# Patient Record
Sex: FEMALE | Race: WHITE
[De-identification: names, ages, dates, MRNs, and addresses within clinical notes are randomized per-mention and may not be internally consistent; named-entity substitution may affect disease eponyms.]

---

## 2017-11-16 ENCOUNTER — HOSPITAL ENCOUNTER (EMERGENCY)
Dept: HOSPITAL 47 - EC | Age: 62
Discharge: HOME | End: 2017-11-16
Payer: SELF-PAY

## 2017-11-16 VITALS — DIASTOLIC BLOOD PRESSURE: 78 MMHG | SYSTOLIC BLOOD PRESSURE: 131 MMHG | HEART RATE: 63 BPM | TEMPERATURE: 97.9 F

## 2017-11-16 VITALS — RESPIRATION RATE: 16 BRPM

## 2017-11-16 DIAGNOSIS — K52.9: Primary | ICD-10-CM

## 2017-11-16 DIAGNOSIS — Z91.048: ICD-10-CM

## 2017-11-16 DIAGNOSIS — Z88.6: ICD-10-CM

## 2017-11-16 DIAGNOSIS — Z87.891: ICD-10-CM

## 2017-11-16 DIAGNOSIS — Z91.018: ICD-10-CM

## 2017-11-16 LAB
ALP SERPL-CCNC: 72 U/L (ref 38–126)
ALT SERPL-CCNC: 50 U/L (ref 9–52)
AMYLASE SERPL-CCNC: <30 U/L (ref 30–110)
ANION GAP SERPL CALC-SCNC: 10 MMOL/L
AST SERPL-CCNC: 24 U/L (ref 14–36)
BASOPHILS # BLD AUTO: 0 K/UL (ref 0–0.2)
BASOPHILS NFR BLD AUTO: 0 %
BUN SERPL-SCNC: 20 MG/DL (ref 7–17)
CALCIUM SPEC-MCNC: 9.8 MG/DL (ref 8.4–10.2)
CH: 28.8
CHCM: 33.4
CHLORIDE SERPL-SCNC: 101 MMOL/L (ref 98–107)
CK SERPL-CCNC: 43 U/L (ref 30–135)
CO2 SERPL-SCNC: 28 MMOL/L (ref 22–30)
EOSINOPHIL # BLD AUTO: 0 K/UL (ref 0–0.7)
EOSINOPHIL NFR BLD AUTO: 0 %
ERYTHROCYTE [DISTWIDTH] IN BLOOD BY AUTOMATED COUNT: 4.94 M/UL (ref 3.8–5.4)
ERYTHROCYTE [DISTWIDTH] IN BLOOD: 12.2 % (ref 11.5–15.5)
GLUCOSE SERPL-MCNC: 135 MG/DL (ref 74–99)
HCT VFR BLD AUTO: 42.7 % (ref 34–46)
HDW: 2.46
HGB BLD-MCNC: 14.1 GM/DL (ref 11.4–16)
LUC NFR BLD AUTO: 1 %
LYMPHOCYTES # SPEC AUTO: 1.7 K/UL (ref 1–4.8)
LYMPHOCYTES NFR SPEC AUTO: 18 %
MCH RBC QN AUTO: 28.6 PG (ref 25–35)
MCHC RBC AUTO-ENTMCNC: 33.1 G/DL (ref 31–37)
MCV RBC AUTO: 86.6 FL (ref 80–100)
MONOCYTES # BLD AUTO: 0.4 K/UL (ref 0–1)
MONOCYTES NFR BLD AUTO: 5 %
NEUTROPHILS # BLD AUTO: 7.2 K/UL (ref 1.3–7.7)
NEUTROPHILS NFR BLD AUTO: 76 %
NON-AFRICAN AMERICAN GFR(MDRD): >60
PARTICLE COUNT: (no result)
PH UR: 5.5 [PH] (ref 5–8)
POTASSIUM SERPL-SCNC: 4.6 MMOL/L (ref 3.5–5.1)
PROT SERPL-MCNC: 6.8 G/DL (ref 6.3–8.2)
PROT UR QL: (no result)
SODIUM SERPL-SCNC: 139 MMOL/L (ref 137–145)
SP GR UR: 1.02 (ref 1–1.03)
SQUAMOUS UR QL AUTO: 9 /HPF (ref 0–4)
TROPONIN I SERPL-MCNC: <0.012 NG/ML (ref 0–0.03)
UA BILLING (MACRO VS. MICRO): (no result)
UROBILINOGEN UR QL STRIP: <2 MG/DL (ref ?–2)
WBC # BLD AUTO: 0.14 10*3/UL
WBC # BLD AUTO: 9.5 K/UL (ref 3.8–10.6)
WBC #/AREA URNS HPF: 28 /HPF (ref 0–5)
WBC (PEROX): 8.96

## 2017-11-16 PROCEDURE — 82150 ASSAY OF AMYLASE: CPT

## 2017-11-16 PROCEDURE — 82553 CREATINE MB FRACTION: CPT

## 2017-11-16 PROCEDURE — 96361 HYDRATE IV INFUSION ADD-ON: CPT

## 2017-11-16 PROCEDURE — 80053 COMPREHEN METABOLIC PANEL: CPT

## 2017-11-16 PROCEDURE — 96360 HYDRATION IV INFUSION INIT: CPT

## 2017-11-16 PROCEDURE — 84484 ASSAY OF TROPONIN QUANT: CPT

## 2017-11-16 PROCEDURE — 99284 EMERGENCY DEPT VISIT MOD MDM: CPT

## 2017-11-16 PROCEDURE — 74000: CPT

## 2017-11-16 PROCEDURE — 82550 ASSAY OF CK (CPK): CPT

## 2017-11-16 PROCEDURE — 85025 COMPLETE CBC W/AUTO DIFF WBC: CPT

## 2017-11-16 PROCEDURE — 76705 ECHO EXAM OF ABDOMEN: CPT

## 2017-11-16 PROCEDURE — 36415 COLL VENOUS BLD VENIPUNCTURE: CPT

## 2017-11-16 PROCEDURE — 83690 ASSAY OF LIPASE: CPT

## 2017-11-16 PROCEDURE — 81001 URINALYSIS AUTO W/SCOPE: CPT

## 2017-11-16 NOTE — US
EXAMINATION TYPE: US gallbladder

 

DATE OF EXAM: 11/16/2017

 

COMPARISON: NONE

 

CLINICAL HISTORY: Pain. Vomiting and diarrhea last night. Nausea and loss of appetite for 1 week 

 

EXAM MEASUREMENTS:

 

Liver Length:  19.3 cm   

Gallbladder Wall:  0.2 cm   

CBD:  0.4 cm

Right Kidney:  11.1 x 4.9 x 4.5 cm

 

 

 

Pancreas:  Obscured by bowel gas

Liver:  hyperechoic area noted adjacent to a hypoechoic focus next to the regi hepatitis = 2.0 x 1.5
 x 1.9cm   

Gallbladder:  no evidence of stones

**Evidence for sonographic Zee's sign:  no

CBD:  wnl 

Right Kidney:  no evidence of hydronephrosis or mass

 

There is no ascites. Right kidney shows normal cortical medullary differentiation.

 

IMPRESSION: Findings may represent fatty infiltration of liver with some focal fatty sparing near the
 regi hepatis, MRI of the liver could be performed for better characterization. There is hepatomegal
y. Exam is somewhat limited.

## 2017-11-16 NOTE — XR
EXAMINATION TYPE: XR KUB

 

DATE OF EXAM: 11/16/2017 3:25 PM

 

CLINICAL HISTORY:  Nausea and vomiting with abdominal pain.

 

TECHNIQUE: Single upright abdominal radiograph was obtained.

 

COMPARISON: Right upper quadrant ultrasound dated 11/16/2017.

 

FINDINGS: Scattered gas is seen in non-distended small bowel loops. Gas and fecal material is seen in
 non-distended colon. There is no visceromegaly, pneumoperitoneum, or abnormal calcification apprecia
torito. The lung bases are clear and the osseous structures are intact.

 

IMPRESSION: 

Nonobstructive bowel gas pattern.
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
## 2020-05-26 ENCOUNTER — HOSPITAL ENCOUNTER (EMERGENCY)
Dept: HOSPITAL 47 - EC | Age: 65
Discharge: HOME | End: 2020-05-26
Payer: COMMERCIAL

## 2020-05-26 VITALS
DIASTOLIC BLOOD PRESSURE: 90 MMHG | TEMPERATURE: 98.1 F | HEART RATE: 80 BPM | RESPIRATION RATE: 18 BRPM | SYSTOLIC BLOOD PRESSURE: 140 MMHG

## 2020-05-26 DIAGNOSIS — R20.2: Primary | ICD-10-CM
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
## 2020-05-26 DIAGNOSIS — M79.622: ICD-10-CM

## 2020-05-26 DIAGNOSIS — Z88.6: ICD-10-CM

## 2020-05-26 DIAGNOSIS — Z91.018: ICD-10-CM

## 2020-05-26 DIAGNOSIS — R91.8: ICD-10-CM

## 2020-05-26 DIAGNOSIS — Z87.891: ICD-10-CM

## 2020-05-26 DIAGNOSIS — M54.2: ICD-10-CM
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
## 2020-05-26 DIAGNOSIS — Z87.39: ICD-10-CM

## 2020-05-26 PROCEDURE — 71046 X-RAY EXAM CHEST 2 VIEWS: CPT

## 2020-05-26 PROCEDURE — 72125 CT NECK SPINE W/O DYE: CPT

## 2020-05-26 PROCEDURE — 99284 EMERGENCY DEPT VISIT MOD MDM: CPT

## 2020-05-26 PROCEDURE — 70450 CT HEAD/BRAIN W/O DYE: CPT
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
## 2020-08-03 ENCOUNTER — HOSPITAL ENCOUNTER (OUTPATIENT)
Dept: HOSPITAL 47 - RADMRIMAIN | Age: 65
Discharge: HOME | End: 2020-08-03
Attending: FAMILY MEDICINE
Payer: COMMERCIAL

## 2020-08-03 DIAGNOSIS — Z53.9: Primary | ICD-10-CM

## 2020-11-04 ENCOUNTER — HOSPITAL ENCOUNTER (OUTPATIENT)
Dept: HOSPITAL 47 - PNWHC3 | Age: 65
Discharge: HOME | End: 2020-11-04
Attending: ANESTHESIOLOGY
Payer: COMMERCIAL

## 2020-11-04 VITALS
SYSTOLIC BLOOD PRESSURE: 193 MMHG | TEMPERATURE: 97.8 F | RESPIRATION RATE: 14 BRPM | HEART RATE: 73 BPM | DIASTOLIC BLOOD PRESSURE: 96 MMHG

## 2020-11-04 DIAGNOSIS — M54.5: Primary | ICD-10-CM

## 2020-11-04 DIAGNOSIS — Z79.899: ICD-10-CM

## 2020-11-04 DIAGNOSIS — Z88.6: ICD-10-CM

## 2020-11-04 DIAGNOSIS — Z87.892: ICD-10-CM

## 2020-11-04 DIAGNOSIS — Z79.891: ICD-10-CM

## 2020-11-04 PROCEDURE — 99211 OFF/OP EST MAY X REQ PHY/QHP: CPT
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
## 2020-11-24 ENCOUNTER — HOSPITAL ENCOUNTER (OUTPATIENT)
Dept: HOSPITAL 47 - ORPAIN | Age: 65
Discharge: HOME | End: 2020-11-24
Attending: ANESTHESIOLOGY
Payer: COMMERCIAL

## 2020-11-24 VITALS — SYSTOLIC BLOOD PRESSURE: 131 MMHG | HEART RATE: 101 BPM | DIASTOLIC BLOOD PRESSURE: 79 MMHG

## 2020-11-24 VITALS — BODY MASS INDEX: 36.7 KG/M2

## 2020-11-24 VITALS — TEMPERATURE: 97.3 F

## 2020-11-24 VITALS — RESPIRATION RATE: 16 BRPM

## 2020-11-24 DIAGNOSIS — M96.1: ICD-10-CM

## 2020-11-24 DIAGNOSIS — M46.1: ICD-10-CM

## 2020-11-24 DIAGNOSIS — Z78.0: ICD-10-CM

## 2020-11-24 DIAGNOSIS — Z88.6: ICD-10-CM

## 2020-11-24 DIAGNOSIS — M54.16: Primary | ICD-10-CM

## 2020-11-24 DIAGNOSIS — Z91.018: ICD-10-CM

## 2020-11-24 PROCEDURE — 64483 NJX AA&/STRD TFRM EPI L/S 1: CPT

## 2020-11-24 PROCEDURE — 99152 MOD SED SAME PHYS/QHP 5/>YRS: CPT

## 2020-11-24 NOTE — P.PCN
Date of Procedure: 11/24/20


Procedure(s) Performed: 








PREOPERATIVE DIAGNOSIS:1- Lumbar radiculopathy .   2-left sacroiliitis.  3-

failed back surgery syndrome lumbar area





POSTOPERATIVE DIAGNOSIS: Same as preoperative diagnoses.





PROCEDURE


1. Transforaminal epidural steroid injection under fluoroscopic guidance at left

 L4-5 level.  (Fluoroscopy images stored on file in the radiology Department )


2. Lumbar epidurogram .





ANESTHESIA: Local with 1% lidocaine 3 ml , moderate sedation with intravenous 

Versed 2 mg  and fentanyle 50 micrograms.





EBL: Minimal


PROCEDURE INDICATION: The patient with low back pain and radiculopathy symptoms 

unresponsive to conservative treatment.


PROCEDURE DESCRIPTION / TECHNIQUE: 


  The patient was seen and identified in the preoperative area. Risks, benefits,

complications, and alternatives were discussed with the patient. The patient 

agreed to proceed with the procedure and signed the consent. IV was started, and

vital signs were stable.


  Patient was taken to the OR and time out was completed. The patient was placed

 in the prone position on procedure table and a pillow was placed under the 

abdomen to reduce lumbar lordosis. The  lumbosacral area was prepped  and draped

in the usual sterile fashion. Critical pause was taken. Vital signs were closely

monitored during the procedure. Conscious sedation was used during the procedure

to decrease patient s anxiety. 


Using oblique fluoroscopy, the chin of the ``Elvin dog at  left L4-5  level 

was identified, and the skin and deeper tissues just below was localized with 1%

lidocaine. Subsequently, a 22-gauge 5-inch  spinal needle was advanced under a 

tunneled view fluoroscopic guidance just underneath the chin of the ``Elvin dog

 at the left L4-5  Under lateral fluoroscopy, the needle was then advanced to 

the posterior border of the  interforaminal  space. After negative aspiration of

CSF and blood and with no paresthesias, 1 mL Isovue  200 contrast dye was 

injected excellent epidurogram and outlining of the  nerve root Subsequently, 3 

mL of block solution containing 80 mg Depo-Medrol  and 2 mL of 0.9% normal 

saline PF  was injected. Needle was removed intact . At the end of the 

procedure, skin was cleansed, and bandages were applied.


COMPLICATIONS:none 


DISPOSITION / PLANS: The patient was placed in a supine position and transferred

to the recovery area in a stable condition for observation. There was no 

evidence of lower extremity motor or sensory deficit after the procedure.  

Patient was discharged from the recovery room after meeting discharge criteria. 

Home discharge instructions were given to the patient by the staff. The patient 

was reexamined prior to discharge.

## 2020-11-24 NOTE — FL
EXAMINATION TYPE: FL guided pain mgmt statistic

 

DATE OF EXAM: 11/24/2020

 

CLINICAL HISTORY: Low back pain.

 

TECHNIQUE: Fluoroscopy.

 

COMPARISON:  None.

 

FINDINGS:  Fluoroscopic guidance was provided during pain relief procedure performed by Dr. Kramer 
.  A total of 23 seconds of fluoroscopic time was utilized during the procedure and 1 spot images are
 acquired. Single image acquired shows needle localization  in the lower lumbar spine.

 

IMPRESSION:  As Above.

## 2020-12-29 ENCOUNTER — HOSPITAL ENCOUNTER (OUTPATIENT)
Dept: HOSPITAL 47 - ORPAIN | Age: 65
Discharge: HOME | End: 2020-12-29
Attending: ANESTHESIOLOGY
Payer: COMMERCIAL

## 2020-12-29 VITALS — SYSTOLIC BLOOD PRESSURE: 148 MMHG | DIASTOLIC BLOOD PRESSURE: 90 MMHG

## 2020-12-29 VITALS — HEART RATE: 108 BPM

## 2020-12-29 VITALS — TEMPERATURE: 96.8 F | RESPIRATION RATE: 18 BRPM

## 2020-12-29 VITALS — BODY MASS INDEX: 36.3 KG/M2

## 2020-12-29 DIAGNOSIS — Z78.0: ICD-10-CM

## 2020-12-29 DIAGNOSIS — M46.1: ICD-10-CM

## 2020-12-29 DIAGNOSIS — M96.1: Primary | ICD-10-CM

## 2020-12-29 PROCEDURE — 64483 NJX AA&/STRD TFRM EPI L/S 1: CPT

## 2020-12-29 PROCEDURE — 99152 MOD SED SAME PHYS/QHP 5/>YRS: CPT

## 2020-12-29 NOTE — P.PCN
Date of Procedure: 12/29/20


Procedure(s) Performed: 





PREOPERATIVE DIAGNOSIS:1- Lumbar radiculopathy .   2-left sacroiliitis.  3-

failed back surgery syndrome lumbar area





POSTOPERATIVE DIAGNOSIS: Same as preoperative diagnoses.





PROCEDURE


1. Transforaminal epidural steroid injection under fluoroscopic guidance at left

 L4-5 level.  (Fluoroscopy images stored on file in the radiology Department )


2. Lumbar epidurogram .





ANESTHESIA: Local with 1% lidocaine 3 ml , moderate sedation with intravenous 

Versed 1 mg  and fentanyle 50 micrograms.





EBL: Minimal


PROCEDURE INDICATION: The patient with low back pain and radiculopathy symptoms 

unresponsive to conservative treatment.


PROCEDURE DESCRIPTION / TECHNIQUE: 


  The patient was seen and identified in the preoperative area. Risks, benefits,

complications, and alternatives were discussed with the patient. The patient 

agreed to proceed with the procedure and signed the consent. IV was started, and

vital signs were stable.


  Patient was taken to the OR and time out was completed. The patient was placed

 in the prone position on procedure table and a pillow was placed under the 

abdomen to reduce lumbar lordosis. The  lumbosacral area was prepped  and draped

in the usual sterile fashion. Critical pause was taken. Vital signs were closely

monitored during the procedure. Conscious sedation was used during the procedure

to decrease patient s anxiety. 


Using oblique fluoroscopy, the chin of the ``Elvin dog at  left L4-5  level 

was identified, and the skin and deeper tissues just below was localized with 1%

lidocaine. Subsequently, a 22-gauge 5-inch  spinal needle was advanced under a 

tunneled view fluoroscopic guidance just underneath the chin of the ``Elvin dog

 at the left L4-5  Under lateral fluoroscopy, the needle was then advanced to 

the posterior border of the  interforaminal  space. After negative aspiration of

CSF and blood and with no paresthesias, 1 mL Isovue  200 contrast dye was 

injected excellent epidurogram and outlining of the  nerve root Subsequently, 3 

mL of block solution containing 80 mg Depo-Medrol  and 2 mL of 0.9% normal 

saline PF  was injected. Needle was removed intact . At the end of the 

procedure, skin was cleansed, and bandages were applied.


COMPLICATIONS:none 


DISPOSITION / PLANS: The patient was placed in a supine position and transferred

to the recovery area in a stable condition for observation. There was no 

evidence of lower extremity motor or sensory deficit after the procedure.  

Patient was discharged from the recovery room after meeting discharge criteria. 

Home discharge instructions were given to the patient by the staff. The patient 

was reexamined prior to discharge.

## 2020-12-29 NOTE — FL
Fluoroscopy

 

HISTORY: Pain

 

8 seconds fluoroscopy time supplied to the referring clinician.  1 intraoperative C-arm images docume
nt the procedure. See dictated report from anesthesia.

## 2021-01-18 ENCOUNTER — HOSPITAL ENCOUNTER (OUTPATIENT)
Dept: HOSPITAL 47 - PNWHC3 | Age: 66
Discharge: HOME | End: 2021-01-18
Payer: COMMERCIAL

## 2021-01-18 VITALS
RESPIRATION RATE: 18 BRPM | HEART RATE: 113 BPM | TEMPERATURE: 98 F | SYSTOLIC BLOOD PRESSURE: 156 MMHG | DIASTOLIC BLOOD PRESSURE: 92 MMHG

## 2021-01-18 DIAGNOSIS — M51.36: ICD-10-CM

## 2021-01-18 DIAGNOSIS — M48.061: ICD-10-CM

## 2021-01-18 DIAGNOSIS — M47.816: ICD-10-CM

## 2021-01-18 DIAGNOSIS — M96.1: Primary | ICD-10-CM

## 2021-01-18 PROCEDURE — 99211 OFF/OP EST MAY X REQ PHY/QHP: CPT
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
## 2021-02-16 ENCOUNTER — HOSPITAL ENCOUNTER (OUTPATIENT)
Dept: HOSPITAL 47 - ORPAIN | Age: 66
Discharge: HOME | End: 2021-02-16
Attending: STUDENT IN AN ORGANIZED HEALTH CARE EDUCATION/TRAINING PROGRAM
Payer: COMMERCIAL

## 2021-02-16 VITALS — RESPIRATION RATE: 20 BRPM | HEART RATE: 105 BPM

## 2021-02-16 VITALS — BODY MASS INDEX: 36.6 KG/M2

## 2021-02-16 VITALS — SYSTOLIC BLOOD PRESSURE: 134 MMHG | DIASTOLIC BLOOD PRESSURE: 77 MMHG

## 2021-02-16 VITALS — TEMPERATURE: 97 F

## 2021-02-16 DIAGNOSIS — Z88.6: ICD-10-CM

## 2021-02-16 DIAGNOSIS — M54.16: Primary | ICD-10-CM

## 2021-02-16 DIAGNOSIS — Z79.891: ICD-10-CM

## 2021-02-16 DIAGNOSIS — Z78.0: ICD-10-CM

## 2021-02-16 PROCEDURE — 64484 NJX AA&/STRD TFRM EPI L/S EA: CPT

## 2021-02-16 PROCEDURE — 64483 NJX AA&/STRD TFRM EPI L/S 1: CPT

## 2021-02-16 PROCEDURE — 99152 MOD SED SAME PHYS/QHP 5/>YRS: CPT

## 2021-02-16 NOTE — P.PCN
Date of Procedure: 02/16/21


Description of Procedure: 


PREOPERATIVE DIAGNOSIS: Lumbar radiculopathy





POSTOPERATIVE DIAGNOSIS: Lumbar radiculopathy





Attending physician: Efrem Sotelo M.D.





PROCEDURE





1. Transforaminal epidural steroid injection under fluoroscopic guidance L3-4 

and L4-L5 level, left side


2. Lumbar epidurogram





ANESTHESIA: Local with 1% lidocaine 3 ml ; IV sedation with Versed and fentanyl 

, sedation time 17 min





PROCEDURE INDICATION: The patient with low back pain and radiculopathy symptoms 

unresponsive to conservative treatment.





Fluoroscopy was used for the procedure and fluoroscopic images were saved to the

radiology portion of patient's chart.





PROCEDURE DESCRIPTION / TECHNIQUE: 





The patient was seen and identified in the preoperative area. Risks, benefits, 

complications, and alternatives were discussed with the patient. The patient 

agreed to proceed with the procedure and signed the consent. IV was started, and

vital signs were stable.





Patient was taken to the OR and time out was completed. The patient was placed  

in the prone position on procedure table and a pillow was placed under the abdo

men to reduce lumbar lordosis. The  lumbosacral area was prepped  and draped in 

the usual sterile fashion.  Vital signs were closely monitored during the 

procedure. Conscious sedation was used.





Using oblique fluoroscopy, the chin of the ``Elvin dog  and the skin and 

deeper tissues just below was localized with 1% lidocaine. Subsequently, a 22-

gauge 3.5-inch spinal needle was advanced under a tunneled view fluoroscopic 

guidance just underneath the chin of the ``Elvin dog . Under lateral 

fluoroscopy, the needle was then advanced to the posterior border of the 

foramen. After negative aspiration of CSF and blood and with no paresthesias, 1 

mL of Isovue-200 contrast dye was injected under live fluoroscopy and there was 

no evidence of intravascular injection.  The injectate solution consisting of 5 

mg of dexamethasone with 1.5 mL of 1% lidocaine was then delivered.  The needle 

was withdrawn intact.  Procedure was repeated at the below level L4-L5.  Total 

of 10 mg of dexamethasone was given.   At the end of the procedure, skin was 

cleansed, and bandages were applied.





COMPLICATIONS: None





COMMENTS:





DISPOSITION / PLANS: The patient was placed in a supine position and transferred

to the recovery area in a stable condition for observation. There was no 

evidence of lower extremity motor or sensory deficit after the procedure.  

Patient was discharged from the recovery room after meeting discharge criteria. 

Home discharge instructions were given to the patient by the staff.  The patient

will follow up in clinic in 2-4 weeks.

## 2021-02-16 NOTE — FL
EXAMINATION TYPE: FL guided pain mgmt statistic

 

DATE OF EXAM: 2/16/2021

 

HISTORY: Fluoroscopy  time

 

41 seconds of fluoroscopy provided. 

 

IMPRESSION:

1. Fluoroscopy time.

## 2021-03-10 ENCOUNTER — HOSPITAL ENCOUNTER (OUTPATIENT)
Age: 66
End: 2021-03-10
Payer: COMMERCIAL

## 2021-03-10 PROCEDURE — 99211 OFF/OP EST MAY X REQ PHY/QHP: CPT
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
## 2021-06-11 ENCOUNTER — HOSPITAL ENCOUNTER (OUTPATIENT)
Dept: HOSPITAL 47 - LABWHC1 | Age: 66
Discharge: HOME | End: 2021-06-11
Attending: ORTHOPAEDIC SURGERY
Payer: SELF-PAY

## 2021-06-11 DIAGNOSIS — Z01.812: Primary | ICD-10-CM

## 2021-06-11 LAB
APTT BLD: 22.1 SEC (ref 22–30)
ERYTHROCYTE [DISTWIDTH] IN BLOOD BY AUTOMATED COUNT: 4.7 X 10*6/UL (ref 4.1–5.2)
ERYTHROCYTE [DISTWIDTH] IN BLOOD: 11.9 % (ref 11.5–14.5)
HCT VFR BLD AUTO: 40.1 % (ref 37.2–46.3)
HGB BLD-MCNC: 12.6 G/DL (ref 12–15)
INR PPP: 0.9 (ref ?–1.2)
MCH RBC QN AUTO: 26.8 PG (ref 27–32)
MCHC RBC AUTO-ENTMCNC: 31.4 G/DL (ref 32–37)
MCV RBC AUTO: 85.3 FL (ref 80–97)
PH UR: 5 [PH] (ref 5–8)
PLATELET # BLD AUTO: 267 X 10*3/UL (ref 140–440)
PT BLD: 10 SEC (ref 9–12)
RBC UR QL: 7 /HPF (ref 0–5)
SP GR UR: 1.02 (ref 1–1.03)
SQUAMOUS UR QL AUTO: 1 /HPF (ref 0–4)
UROBILINOGEN UR QL STRIP: <2 MG/DL (ref ?–2)
WBC # BLD AUTO: 8.13 X 10*3/UL (ref 4.5–10)
WBC #/AREA URNS HPF: 1 /HPF (ref 0–5)

## 2021-06-11 PROCEDURE — 87070 CULTURE OTHR SPECIMN AEROBIC: CPT

## 2021-06-11 PROCEDURE — 85610 PROTHROMBIN TIME: CPT

## 2021-06-11 PROCEDURE — 83036 HEMOGLOBIN GLYCOSYLATED A1C: CPT

## 2021-06-11 PROCEDURE — 36415 COLL VENOUS BLD VENIPUNCTURE: CPT

## 2021-06-11 PROCEDURE — 84134 ASSAY OF PREALBUMIN: CPT

## 2021-06-11 PROCEDURE — 84443 ASSAY THYROID STIM HORMONE: CPT

## 2021-06-11 PROCEDURE — 84481 FREE ASSAY (FT-3): CPT

## 2021-06-11 PROCEDURE — 85027 COMPLETE CBC AUTOMATED: CPT

## 2021-06-11 PROCEDURE — 81001 URINALYSIS AUTO W/SCOPE: CPT

## 2021-06-11 PROCEDURE — 85730 THROMBOPLASTIN TIME PARTIAL: CPT

## 2021-06-11 PROCEDURE — 84439 ASSAY OF FREE THYROXINE: CPT

## 2021-06-11 PROCEDURE — 80048 BASIC METABOLIC PNL TOTAL CA: CPT

## 2021-06-11 PROCEDURE — 82306 VITAMIN D 25 HYDROXY: CPT

## 2021-06-12 LAB
ANION GAP SERPL CALC-SCNC: 10.3 MMOL/L (ref 4–12)
BUN SERPL-SCNC: 18 MG/DL (ref 9–27)
BUN/CREAT SERPL: 30 RATIO (ref 12–20)
CALCIUM SPEC-MCNC: 9.8 MG/DL (ref 8.7–10.3)
CHLORIDE SERPL-SCNC: 106 MMOL/L (ref 96–109)
CO2 SERPL-SCNC: 27.7 MMOL/L (ref 21.6–31.8)
GLUCOSE SERPL-MCNC: 200 MG/DL (ref 70–110)
HBA1C MFR BLD: 8.4 % (ref 4–6)
POTASSIUM SERPL-SCNC: 4.7 MMOL/L (ref 3.5–5.5)
PREALB SERPL-MCNC: 13 MG/DL (ref 18–42)
SODIUM SERPL-SCNC: 144 MMOL/L (ref 135–145)
T4 FREE SERPL-MCNC: 2.6 NG/DL (ref 0.8–1.8)

## 2021-06-14 ENCOUNTER — HOSPITAL ENCOUNTER (OUTPATIENT)
Dept: HOSPITAL 47 - RADXRMAIN | Age: 66
Discharge: HOME | End: 2021-06-14
Attending: ORTHOPAEDIC SURGERY
Payer: SELF-PAY

## 2021-06-14 DIAGNOSIS — M12.88: ICD-10-CM

## 2021-06-14 DIAGNOSIS — M51.35: Primary | ICD-10-CM

## 2021-06-14 DIAGNOSIS — M43.16: ICD-10-CM

## 2021-06-14 PROCEDURE — 72070 X-RAY EXAM THORAC SPINE 2VWS: CPT

## 2021-06-14 PROCEDURE — 72114 X-RAY EXAM L-S SPINE BENDING: CPT

## 2021-06-14 NOTE — XR
EXAMINATION TYPE: XR thoracic spine 2V

 

DATE OF EXAM: 6/14/2021

 

COMPARISON: NONE

 

HISTORY: Pain

 

TECHNIQUE: 3 views submitted

 

FINDINGS:

Alignment is anatomic.  There is no compression deformities hypertrophic and degenerative changes not
ed.

 

IMPRESSION:

1. Multilevel degenerative disc disease.

## 2021-06-14 NOTE — XR
EXAM TYPE: LUMBAR SPINE X RAY SERIES

 

COMPARISON: 8/24/2012

 

HISTORY: Pain

 

TECHNIQUE: 4 views are submitted.

 

FINDINGS:

Alignment is anatomic.  The pedicles are intact.  The transverse processes are intact.  There is diff
use osteopenia. Multilevel hypertrophic degenerative changes seen. Stable grade 1 anterolisthesis L4 
on L5. Chronic appearing deformity superior endplate L2. Facet arthropathy L3-4, L4-5 and L5-S1. Ante
rolisthesis present on both flexion and extension views at L4-5.

 

IMPRESSION:

1. Multilevel degenerative disc disease with grade 1 anterolisthesis L4 on L5. Similar to the prior e
xam.

2. Multilevel facet arthropathy.

## 2021-07-29 ENCOUNTER — HOSPITAL ENCOUNTER (OUTPATIENT)
Dept: HOSPITAL 47 - LABPAT | Age: 66
Discharge: HOME | End: 2021-07-29
Attending: INTERNAL MEDICINE
Payer: COMMERCIAL

## 2021-07-29 DIAGNOSIS — I48.11: ICD-10-CM

## 2021-07-29 DIAGNOSIS — Z01.812: Primary | ICD-10-CM

## 2021-07-29 LAB
ANION GAP SERPL CALC-SCNC: 7 MMOL/L
BUN SERPL-SCNC: 13 MG/DL (ref 7–17)
CHLORIDE SERPL-SCNC: 106 MMOL/L (ref 98–107)
CO2 SERPL-SCNC: 27 MMOL/L (ref 22–30)
ERYTHROCYTE [DISTWIDTH] IN BLOOD BY AUTOMATED COUNT: 4.82 M/UL (ref 3.8–5.4)
ERYTHROCYTE [DISTWIDTH] IN BLOOD: 12.9 % (ref 11.5–15.5)
HCT VFR BLD AUTO: 40.6 % (ref 34–46)
HGB BLD-MCNC: 13.6 GM/DL (ref 11.4–16)
MCH RBC QN AUTO: 28.1 PG (ref 25–35)
MCHC RBC AUTO-ENTMCNC: 33.4 G/DL (ref 31–37)
MCV RBC AUTO: 84.1 FL (ref 80–100)
PLATELET # BLD AUTO: 283 K/UL (ref 150–450)
POTASSIUM SERPL-SCNC: 4.5 MMOL/L (ref 3.5–5.1)
SODIUM SERPL-SCNC: 140 MMOL/L (ref 137–145)
WBC # BLD AUTO: 7.1 K/UL (ref 3.8–10.6)

## 2021-07-29 PROCEDURE — 84520 ASSAY OF UREA NITROGEN: CPT

## 2021-07-29 PROCEDURE — 85027 COMPLETE CBC AUTOMATED: CPT

## 2021-07-29 PROCEDURE — 36415 COLL VENOUS BLD VENIPUNCTURE: CPT

## 2021-07-29 PROCEDURE — 80051 ELECTROLYTE PANEL: CPT

## 2021-07-29 PROCEDURE — 82565 ASSAY OF CREATININE: CPT

## 2021-08-02 ENCOUNTER — HOSPITAL ENCOUNTER (OUTPATIENT)
Dept: HOSPITAL 47 - CATHCVL | Age: 66
Discharge: HOME | End: 2021-08-02
Attending: INTERNAL MEDICINE
Payer: COMMERCIAL

## 2021-08-02 VITALS — HEART RATE: 78 BPM | DIASTOLIC BLOOD PRESSURE: 69 MMHG | SYSTOLIC BLOOD PRESSURE: 131 MMHG

## 2021-08-02 VITALS — TEMPERATURE: 98 F | RESPIRATION RATE: 16 BRPM

## 2021-08-02 VITALS — BODY MASS INDEX: 37.8 KG/M2

## 2021-08-02 DIAGNOSIS — I51.89: ICD-10-CM

## 2021-08-02 DIAGNOSIS — Z79.01: ICD-10-CM

## 2021-08-02 DIAGNOSIS — E07.9: ICD-10-CM

## 2021-08-02 DIAGNOSIS — I34.0: ICD-10-CM

## 2021-08-02 DIAGNOSIS — E11.9: ICD-10-CM

## 2021-08-02 DIAGNOSIS — I10: ICD-10-CM

## 2021-08-02 DIAGNOSIS — I48.91: Primary | ICD-10-CM

## 2021-08-02 DIAGNOSIS — I48.0: ICD-10-CM

## 2021-08-02 DIAGNOSIS — Q21.1: ICD-10-CM

## 2021-08-02 LAB — GLUCOSE BLD-MCNC: 140 MG/DL (ref 75–99)

## 2021-08-02 PROCEDURE — 93325 DOPPLER ECHO COLOR FLOW MAPG: CPT

## 2021-08-02 PROCEDURE — 93320 DOPPLER ECHO COMPLETE: CPT

## 2021-08-02 PROCEDURE — 93312 ECHO TRANSESOPHAGEAL: CPT

## 2021-08-02 PROCEDURE — 92960 CARDIOVERSION ELECTRIC EXT: CPT

## 2021-08-02 RX ADMIN — BENZOCAINE ONE SPRAY: 200 SPRAY DENTAL; ORAL; PERIODONTAL at 07:40

## 2021-08-02 RX ADMIN — BENZOCAINE ONE SPRAY: 200 SPRAY DENTAL; ORAL; PERIODONTAL at 07:30

## 2021-08-02 NOTE — ECHOT
TRANSESOPHAGEAL ECHOCARDIOGRAM



DATE OF SERVICE:

August 2, 2021.



PERFORMING PHYSICIAN:

Damon Woods MD.



PROCEDURE PERFORMED:

Transesophageal echocardiogram.



INDICATION:

Rule out intracardiac thrombus before cardioversion.



COMPLICATION:

None.



LEVEL OF SEDATION:

The procedure was performed using propofol with CRNA in the room.



PROCEDURE DESCRIPTION:

After obtaining informed consent, the patient was brought to the recovery room.  The

pulse oximetry and heart rate monitors were attached to the patient.



Subsequently and after the patient was sedated using propofol, the transesophageal

echocardiogram probe was advanced to the mid esophageal where 2D echocardiogram images

as well as color Doppler images of various cardiac structures were obtained.

Particular attention was made to the left atrial appendage.



The procedure was completed without any complication.



FINDINGS:

The left ventricle is dilated.  The left ventricular systolic function is impaired with

EF around 40% with global hypokinesia.  The right ventricle appeared to be of normal

size and function.  The left atrium appeared to be mildly dilated.  The left atrial

appendage appeared to be free from any thrombus.  The interatrial septum appeared to be

intact without any evidence of shunt.  The aortic valve appeared to be trileaflet valve

without stenosis or regurgitation.  The mitral valve seems to be mildly thickened with

moderate mitral regurgitation.  There was mild to moderate tricuspid regurgitation.



CONCLUSION:

1. Intact left atrial appendage without any evidence of thrombus.

2. Intact interatrial septum without any evidence of shunt.

3. Impaired LV function with EF around 40% with global hypokinesia.

4. Thickened mitral valve leaflets with moderate mitral regurgitation.

5. Trileaflet aortic valve without stenosis or regurgitation.

6. Normal pulmonic valve with mild pulmonic insufficiency.

7. No evidence of pericardial effusion.





MMODL / IJN: [id number] / Job#: [id number]

## 2021-08-02 NOTE — CE
CARDIAC ELECTROPHYSIOLOGY REPORT



DATE OF SERVICE:

August 2, 2021.



PERFORMING PHYSICIAN:

Damon Woods MD.



PROCEDURE PERFORMED:

Cardioversion of atrial fibrillation.



COMPLICATION:

None.



LEVEL OF SEDATION:

The procedure was performed using propofol with CRNA in the room.



PROCEDURE DESCRIPTION:

After transesophageal echocardiogram was performed and intracardiac thrombus was ruled

out, we pursued cardioversion with the patient.  Cardioverted from atrial fibrillation

to normal sinus mechanism using 200 joules on first attempt.



CONCLUSION:

Successful cardioversion of atrial fibrillation to normal sinus mechanism using 200

joules on first attempt.





MMODL / IJN: [id number] / Job#: [id number]

## 2021-11-30 ENCOUNTER — HOSPITAL ENCOUNTER (OUTPATIENT)
Dept: HOSPITAL 47 - LABWHC1 | Age: 66
Discharge: HOME | End: 2021-11-30
Attending: ORTHOPAEDIC SURGERY
Payer: COMMERCIAL

## 2021-11-30 DIAGNOSIS — Z01.812: Primary | ICD-10-CM

## 2021-11-30 LAB
ANION GAP SERPL CALC-SCNC: 11.1 MMOL/L (ref 10–18)
APTT BLD: 23.8 SEC (ref 22–30)
BUN SERPL-SCNC: 13.7 MG/DL (ref 9–27)
BUN/CREAT SERPL: 26.92 RATIO (ref 12–20)
CALCIUM SPEC-MCNC: 9.2 MG/DL (ref 8.7–10.3)
CHLORIDE SERPL-SCNC: 103 MMOL/L (ref 96–109)
CO2 SERPL-SCNC: 27.9 MMOL/L (ref 20–27.5)
ERYTHROCYTE [DISTWIDTH] IN BLOOD BY AUTOMATED COUNT: 4.44 X 10*6/UL (ref 4.1–5.2)
ERYTHROCYTE [DISTWIDTH] IN BLOOD: 13.2 % (ref 11.5–14.5)
GLUCOSE SERPL-MCNC: 151 MG/DL (ref 70–110)
HCT VFR BLD AUTO: 39.3 % (ref 37.2–46.3)
HGB BLD-MCNC: 12.3 G/DL (ref 12–15)
INR PPP: 0.9 (ref ?–1.2)
MCH RBC QN AUTO: 27.7 PG (ref 27–32)
MCHC RBC AUTO-ENTMCNC: 31.3 G/DL (ref 32–37)
MCV RBC AUTO: 88.5 FL (ref 80–97)
PH UR: 7.5 [PH] (ref 5–8)
PLATELET # BLD AUTO: 259 X 10*3/UL (ref 140–440)
POTASSIUM SERPL-SCNC: 4.8 MMOL/L (ref 3.5–5.5)
PREALB SERPL-MCNC: 19.1 MG/DL (ref 18–42)
PT BLD: 10 SEC (ref 9–12)
SODIUM SERPL-SCNC: 142 MMOL/L (ref 135–145)
SP GR UR: 1.02 (ref 1–1.03)
T4 FREE SERPL-MCNC: 1.2 NG/DL (ref 0.8–1.8)
UROBILINOGEN UR QL STRIP: <2 MG/DL (ref ?–2)
WBC # BLD AUTO: 6.97 X 10*3/UL (ref 4.5–10)

## 2021-11-30 PROCEDURE — 84481 FREE ASSAY (FT-3): CPT

## 2021-11-30 PROCEDURE — 84443 ASSAY THYROID STIM HORMONE: CPT

## 2021-11-30 PROCEDURE — 85730 THROMBOPLASTIN TIME PARTIAL: CPT

## 2021-11-30 PROCEDURE — 84439 ASSAY OF FREE THYROXINE: CPT

## 2021-11-30 PROCEDURE — 85610 PROTHROMBIN TIME: CPT

## 2021-11-30 PROCEDURE — 85027 COMPLETE CBC AUTOMATED: CPT

## 2021-11-30 PROCEDURE — 81003 URINALYSIS AUTO W/O SCOPE: CPT

## 2021-11-30 PROCEDURE — 82306 VITAMIN D 25 HYDROXY: CPT

## 2021-11-30 PROCEDURE — 36415 COLL VENOUS BLD VENIPUNCTURE: CPT

## 2021-11-30 PROCEDURE — 84134 ASSAY OF PREALBUMIN: CPT

## 2021-11-30 PROCEDURE — 80048 BASIC METABOLIC PNL TOTAL CA: CPT

## 2022-02-28 ENCOUNTER — HOSPITAL ENCOUNTER (OUTPATIENT)
Dept: HOSPITAL 47 - LABWHC1 | Age: 67
Discharge: HOME | End: 2022-02-28
Attending: INTERNAL MEDICINE
Payer: COMMERCIAL

## 2022-02-28 DIAGNOSIS — E05.90: ICD-10-CM

## 2022-02-28 DIAGNOSIS — E11.65: Primary | ICD-10-CM

## 2022-02-28 LAB
ALT SERPL-CCNC: 23 U/L (ref 8–44)
AST SERPL-CCNC: 16 U/L (ref 13–35)
ERYTHROCYTE [DISTWIDTH] IN BLOOD BY AUTOMATED COUNT: 4.52 X 10*6/UL (ref 4.1–5.2)
ERYTHROCYTE [DISTWIDTH] IN BLOOD: 13 % (ref 11.5–14.5)
HCT VFR BLD AUTO: 40.7 % (ref 37.2–46.3)
HGB BLD-MCNC: 12.4 G/DL (ref 12–15)
MCH RBC QN AUTO: 27.4 PG (ref 27–32)
MCHC RBC AUTO-ENTMCNC: 30.5 G/DL (ref 32–37)
MCV RBC AUTO: 90 FL (ref 80–97)
NRBC BLD AUTO-RTO: 0 /100 WBCS (ref 0–0)
PLATELET # BLD AUTO: 301 X 10*3/UL (ref 140–440)
T4 FREE SERPL-MCNC: 0.86 NG/DL (ref 0.8–1.8)
WBC # BLD AUTO: 9.09 X 10*3/UL (ref 4.5–10)

## 2022-02-28 PROCEDURE — 84450 TRANSFERASE (AST) (SGOT): CPT

## 2022-02-28 PROCEDURE — 84439 ASSAY OF FREE THYROXINE: CPT

## 2022-02-28 PROCEDURE — 84481 FREE ASSAY (FT-3): CPT

## 2022-02-28 PROCEDURE — 36415 COLL VENOUS BLD VENIPUNCTURE: CPT

## 2022-02-28 PROCEDURE — 85027 COMPLETE CBC AUTOMATED: CPT

## 2022-02-28 PROCEDURE — 84443 ASSAY THYROID STIM HORMONE: CPT

## 2022-02-28 PROCEDURE — 83036 HEMOGLOBIN GLYCOSYLATED A1C: CPT

## 2022-02-28 PROCEDURE — 84460 ALANINE AMINO (ALT) (SGPT): CPT

## 2022-03-31 ENCOUNTER — HOSPITAL ENCOUNTER (OUTPATIENT)
Dept: HOSPITAL 47 - LABPAT | Age: 67
Discharge: HOME | End: 2022-03-31
Attending: INTERNAL MEDICINE
Payer: COMMERCIAL

## 2022-03-31 DIAGNOSIS — Z01.812: Primary | ICD-10-CM

## 2022-03-31 DIAGNOSIS — I48.0: ICD-10-CM

## 2022-03-31 LAB
ANION GAP SERPL CALC-SCNC: 12.3 MMOL/L (ref 10–18)
BUN SERPL-SCNC: 13.4 MG/DL (ref 9–27)
CHLORIDE SERPL-SCNC: 102 MMOL/L (ref 96–109)
CO2 SERPL-SCNC: 25.7 MMOL/L (ref 20–27.5)
ERYTHROCYTE [DISTWIDTH] IN BLOOD BY AUTOMATED COUNT: 4.8 X 10*6/UL (ref 4.1–5.2)
ERYTHROCYTE [DISTWIDTH] IN BLOOD: 13.2 % (ref 11.5–14.5)
HCT VFR BLD AUTO: 43 % (ref 37.2–46.3)
HGB BLD-MCNC: 13.3 G/DL (ref 12–15)
MCH RBC QN AUTO: 27.7 PG (ref 27–32)
MCHC RBC AUTO-ENTMCNC: 30.9 G/DL (ref 32–37)
MCV RBC AUTO: 89.6 FL (ref 80–97)
NRBC BLD AUTO-RTO: 0 /100 WBCS (ref 0–0)
PLATELET # BLD AUTO: 299 X 10*3/UL (ref 140–440)
POTASSIUM SERPL-SCNC: 5.2 MMOL/L (ref 3.5–5.5)
SODIUM SERPL-SCNC: 140 MMOL/L (ref 135–145)
WBC # BLD AUTO: 6.87 X 10*3/UL (ref 4.5–10)

## 2022-03-31 PROCEDURE — 80051 ELECTROLYTE PANEL: CPT

## 2022-03-31 PROCEDURE — 84520 ASSAY OF UREA NITROGEN: CPT

## 2022-03-31 PROCEDURE — 85027 COMPLETE CBC AUTOMATED: CPT

## 2022-03-31 PROCEDURE — 82565 ASSAY OF CREATININE: CPT

## 2022-08-05 ENCOUNTER — HOSPITAL ENCOUNTER (OUTPATIENT)
Dept: HOSPITAL 47 - CATHCVL | Age: 67
Discharge: HOME | End: 2022-08-05
Attending: INTERNAL MEDICINE
Payer: MEDICARE

## 2022-08-05 VITALS — BODY MASS INDEX: 40.3 KG/M2

## 2022-08-05 VITALS — RESPIRATION RATE: 16 BRPM

## 2022-08-05 VITALS — HEART RATE: 57 BPM | DIASTOLIC BLOOD PRESSURE: 66 MMHG | SYSTOLIC BLOOD PRESSURE: 143 MMHG

## 2022-08-05 VITALS — TEMPERATURE: 98.8 F

## 2022-08-05 DIAGNOSIS — Z79.899: ICD-10-CM

## 2022-08-05 DIAGNOSIS — Z79.84: ICD-10-CM

## 2022-08-05 DIAGNOSIS — Z20.822: ICD-10-CM

## 2022-08-05 DIAGNOSIS — Z87.891: ICD-10-CM

## 2022-08-05 DIAGNOSIS — Z91.09: ICD-10-CM

## 2022-08-05 DIAGNOSIS — E11.9: ICD-10-CM

## 2022-08-05 DIAGNOSIS — Z80.9: ICD-10-CM

## 2022-08-05 DIAGNOSIS — Z91.018: ICD-10-CM

## 2022-08-05 DIAGNOSIS — Z79.01: ICD-10-CM

## 2022-08-05 DIAGNOSIS — Z82.49: ICD-10-CM

## 2022-08-05 DIAGNOSIS — Z88.6: ICD-10-CM

## 2022-08-05 DIAGNOSIS — I48.0: Primary | ICD-10-CM

## 2022-08-05 DIAGNOSIS — I10: ICD-10-CM

## 2022-08-05 LAB
ANION GAP SERPL CALC-SCNC: 6 MMOL/L
BUN SERPL-SCNC: 11 MG/DL (ref 7–17)
CALCIUM SPEC-MCNC: 8.5 MG/DL (ref 8.4–10.2)
CHLORIDE SERPL-SCNC: 107 MMOL/L (ref 98–107)
CO2 SERPL-SCNC: 27 MMOL/L (ref 22–30)
GLUCOSE BLD-MCNC: 125 MG/DL (ref 70–110)
GLUCOSE SERPL-MCNC: 122 MG/DL (ref 74–99)
POTASSIUM SERPL-SCNC: 4.5 MMOL/L (ref 3.5–5.1)
SODIUM SERPL-SCNC: 140 MMOL/L (ref 137–145)

## 2022-08-05 PROCEDURE — 87635 SARS-COV-2 COVID-19 AMP PRB: CPT

## 2022-08-05 PROCEDURE — 80048 BASIC METABOLIC PNL TOTAL CA: CPT

## 2022-08-05 PROCEDURE — 92960 CARDIOVERSION ELECTRIC EXT: CPT

## 2022-08-05 NOTE — P.PCN
Date of Procedure: 08/05/22


Operative Findings: 








Cardioversion Report





Performing physician


Damon Woods M.D.





Procedure performed


Successful cardioversion of atrial fibrillation to normal sinus mechanism using 

200 J at first attempt





Indication


Symptomatic atrial fibrillation





Complication


None





Level of sedation


The procedure was performed under deep sedation using propofol with CRNA in the 

room





Procedure description


After obtaining an informed consent the patient was brought to the recovery 

room.





Sedation was introduced using propofol with CRNA in the room.





Subsequently the patient cardioverted from atrial fibrillation to normal sinus 

mechanism using 200 J and first attempt





Conclusion


Successful cardioversion of atrial fibrillation to normal sinus mechanism using 

200 J





Postprocedure management


Continue the current medical regimen


Continue oral anticoagulation


Follow-up with the patient